# Patient Record
Sex: FEMALE | Race: WHITE | URBAN - METROPOLITAN AREA
[De-identification: names, ages, dates, MRNs, and addresses within clinical notes are randomized per-mention and may not be internally consistent; named-entity substitution may affect disease eponyms.]

---

## 2019-04-18 ENCOUNTER — OFFICE VISIT (OUTPATIENT)
Dept: PHYSICAL MEDICINE AND REHAB | Age: 49
End: 2019-04-18
Payer: COMMERCIAL

## 2019-04-18 DIAGNOSIS — M79.601 PARESTHESIA AND PAIN OF BOTH UPPER EXTREMITIES: ICD-10-CM

## 2019-04-18 DIAGNOSIS — G56.03 BILATERAL CARPAL TUNNEL SYNDROME: Primary | ICD-10-CM

## 2019-04-18 DIAGNOSIS — R20.2 PARESTHESIA AND PAIN OF BOTH UPPER EXTREMITIES: ICD-10-CM

## 2019-04-18 DIAGNOSIS — M79.602 PARESTHESIA AND PAIN OF BOTH UPPER EXTREMITIES: ICD-10-CM

## 2019-04-18 PROCEDURE — 95885 MUSC TST DONE W/NERV TST LIM: CPT | Performed by: PHYSICAL MEDICINE & REHABILITATION

## 2019-04-18 PROCEDURE — 95911 NRV CNDJ TEST 9-10 STUDIES: CPT | Performed by: PHYSICAL MEDICINE & REHABILITATION

## 2019-04-18 PROCEDURE — 99999 PR OFFICE/OUTPT VISIT,PROCEDURE ONLY: CPT | Performed by: PHYSICAL MEDICINE & REHABILITATION

## 2019-04-18 NOTE — PROGRESS NOTES
EMG REPORT     CHIEF COMPLAINT: Numbness, tingling and pain both arms. HISTORY OF PRESENT ILLNESS: 51 y.o. left hand dominant female with a long history of spinal and limb pains with numbness and tingling. Her local neurologist diagnosed fibromyalgia and multiple sclerosis. Her baseline pains have been much worse over the last month, particularly in the left shoulder. She finds it difficult to raise her left elbow above the shoulder level. She has had worse stiffness in her neck and numbness in the arms. She drops things from her  and is not sleeping well. She rated her pain as 10/10 in intensity. She has no history of diabetes or thyroid disorder. PHYSICAL EXAMINATION: Alert. Guarded cervical rotation bilaterally. Spurling's maneuver is negative. Reflexes were absent. There was no clonus or tremor noted. No atrophy was present. Tinel sign was negative. She had give way with manual muscle testing across the elbows and wrists. Perception to light touch was blunted over the thumb and index finger of each hand. NERVE CONDUCTION STUDIES:     MOTOR         LATENCY NORMAL AMPLITUDE DISTANCE COND. CARMENZA. RIGHT  MEDIAN 4.7 < 4.2 msec 3.2 8 cm 49   LEFT  MEDIAN 5.8 < 4.2 msec 4.5 8 cm 43   RIGHT  ULNAR 2.9 < 4.2 msec 9.6 8 cm >50   LEFT  ULNAR 2.8 < 4.2 msec 5.4 8 cm 55      SENSORY  ORTHODROMIC        LATENCY NORMAL AMPLITUDE DISTANCE   RIGHT MEDIAN 2.9 <2.3 msec 7 10 cm   LEFT  MEDIAN Absent < 2.3 msec  10 cm   RIGHT  ULNAR 2.4 < 2.3 msec 10 10 cm   LEFT  ULNAR 2.5 < 2.3 msec 7 10 cm       Left dorsal ulnar sensory: dL 2.4 msec, amp 6 microV.         NEEDLE EMG:      RIGHT   LEFT     Insertional Activity Spontaneous  Activity Volutional  MUAP's Insertional Activity Spontaneous  Activity Volutional  MUAP's   Cerv Parasp Normal None Normal Normal None Normal   Infraspinatus Normal None Normal Normal None Normal   Deltoid   Normal None Normal Normal None Normal   Biceps   Normal None Normal Normal None Normal Triceps   Normal None Normal Normal None Normal   Pronator Teres Normal None Normal Normal None Normal   Extensor Indicis Normal None Normal Normal None Normal   1st Dorsal Interosseus Normal None Normal Normal None Normal   ADM Normal None Normal Normal None Normal   Abductor Pollicis Br. Increased Occ Dec #, Larger Increased + Dec #, Larger       FINDINGS:   EMG of the cervical paraspinals and both upper limbs revealed no proximal abnormalities. Positive waves and fibrillations were identified in the APB muscles of each hand. In those irritable muscles, motor units were reduced in number and larger. The left median sensory response was missing. The right median sensory latency was delayed across the wrist. Both median motor latencies were delayed and the evoked amplitudes were reduced. The left median motor conduction velocity was slowed through the forearm segment. Ulnar conductions were generally well maintained. IMPRESSION:      1. Abnormal EMG. 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 2. No evidence of a concurrent spinal nerve root injury (radiculopathy), plexopathy, generalized neuropathy or primary muscle disease. Thank you for this interesting referral.    Clinically, she does not demonstrate any upper motor neuron signs.

## 2019-04-18 NOTE — LETTER
April 18, 2019        Fleurette Fothergill, [address] [place] [address] [address], [address]        Patient Name: My Lake   MRN Number: [id number]   YOB: 1970   Date Of Visit: 04/18/2019        Dear Fleurette Fothergill CNP,       Thank you for referring My Lake to me for electrodiagnostic testing. Attached are the findings of the EMG and my assessment. If you have any further questions, please do not hesitate to call me. Thank you for this interesting referral.      Sincerely,          JILLIAN Cole MD.